# Patient Record
Sex: FEMALE | URBAN - METROPOLITAN AREA
[De-identification: names, ages, dates, MRNs, and addresses within clinical notes are randomized per-mention and may not be internally consistent; named-entity substitution may affect disease eponyms.]

---

## 2021-12-15 ENCOUNTER — OFFICE VISIT (OUTPATIENT)
Dept: URBAN - METROPOLITAN AREA CLINIC 12 | Facility: CLINIC | Age: 71
End: 2021-12-15
Payer: MEDICARE

## 2021-12-15 ENCOUNTER — DASHBOARD ENCOUNTERS (OUTPATIENT)
Age: 71
End: 2021-12-15

## 2021-12-15 DIAGNOSIS — Z12.11 SCREEN FOR COLON CANCER: ICD-10-CM

## 2021-12-15 DIAGNOSIS — R74.8 ELEVATED LIVER ENZYMES: ICD-10-CM

## 2021-12-15 PROCEDURE — 99203 OFFICE O/P NEW LOW 30 MIN: CPT | Performed by: INTERNAL MEDICINE

## 2021-12-15 PROCEDURE — 99243 OFF/OP CNSLTJ NEW/EST LOW 30: CPT | Performed by: INTERNAL MEDICINE

## 2021-12-15 NOTE — HPI-TODAY'S VISIT:
72 yo female presenting on referral from Dr. Alexis Mclain with [facility] for elevated liver enzymes.  A copy of this note will be sent to the referring physician.    -found to have liver enzyme elevation on routine labs/test- ast /alt 112/202.  -never been told previously there are elevated enzymes -no new medication current medications- no prescription new herbal supplement- she stopped all the supplements she was taking except biotin, was taking multiple supplements before that actually - herbal probiotic as well as vitamins. biowork 4 -etoh- wine once a week, hx ivdu-never. no tobacco.  works in the [employer] industry-  -denies ibuprofen , alleve, aspirin, tylenol -recent diagnosis of dm, on dietary control now and has glucose monitoring.   + high cholesterol. working on weight loss -fh of liver disease- father with history of liver disease but states that he was on amiodarone and etoh history and developed liver issues -unsure when she would have had bloodwork normal before -denies abdominal pain, abdominal swelling, yellowing of skin, darkening of urine

## 2022-03-10 ENCOUNTER — TELEPHONE ENCOUNTER (OUTPATIENT)
Dept: URBAN - METROPOLITAN AREA CLINIC 49 | Facility: CLINIC | Age: 72
End: 2022-03-10

## 2022-03-15 ENCOUNTER — OFFICE VISIT (OUTPATIENT)
Dept: URBAN - METROPOLITAN AREA SURGERY CENTER 15 | Facility: SURGERY CENTER | Age: 72
End: 2022-03-15

## 2022-04-21 ENCOUNTER — TELEPHONE ENCOUNTER (OUTPATIENT)
Dept: URBAN - METROPOLITAN AREA CLINIC 23 | Facility: CLINIC | Age: 72
End: 2022-04-21

## 2022-04-25 ENCOUNTER — OFFICE VISIT (OUTPATIENT)
Dept: URBAN - METROPOLITAN AREA SURGERY CENTER 15 | Facility: SURGERY CENTER | Age: 72
End: 2022-04-25
Payer: MEDICARE

## 2022-04-25 ENCOUNTER — TELEPHONE ENCOUNTER (OUTPATIENT)
Dept: URBAN - METROPOLITAN AREA CLINIC 92 | Facility: CLINIC | Age: 72
End: 2022-04-25

## 2022-04-25 DIAGNOSIS — Z53.8 FAILED ATTEMPTED SURGICAL PROCEDURE: ICD-10-CM

## 2022-04-25 DIAGNOSIS — Z12.11 COLON CANCER SCREENING: ICD-10-CM

## 2022-04-25 PROCEDURE — G8907 PT DOC NO EVENTS ON DISCHARG: HCPCS | Performed by: INTERNAL MEDICINE

## 2022-04-25 PROCEDURE — G0121 COLON CA SCRN NOT HI RSK IND: HCPCS | Performed by: INTERNAL MEDICINE

## 2022-04-26 ENCOUNTER — CLAIMS CREATED FROM THE CLAIM WINDOW (OUTPATIENT)
Dept: URBAN - METROPOLITAN AREA CLINIC 4 | Facility: CLINIC | Age: 72
End: 2022-04-26
Payer: MEDICARE

## 2022-04-26 ENCOUNTER — OFFICE VISIT (OUTPATIENT)
Dept: URBAN - METROPOLITAN AREA SURGERY CENTER 15 | Facility: SURGERY CENTER | Age: 72
End: 2022-04-26
Payer: MEDICARE

## 2022-04-26 DIAGNOSIS — D12.0 ADENOMA OF CECUM: ICD-10-CM

## 2022-04-26 DIAGNOSIS — D12.0 BENIGN NEOPLASM OF CECUM: ICD-10-CM

## 2022-04-26 DIAGNOSIS — K63.89 CYST OF DUODENUM: ICD-10-CM

## 2022-04-26 DIAGNOSIS — D12.3 BENIGN NEOPLASM OF TRANSVERSE COLON: ICD-10-CM

## 2022-04-26 DIAGNOSIS — D12.3 ADENOMA OF TRANSVERSE COLON: ICD-10-CM

## 2022-04-26 DIAGNOSIS — Z12.11 COLON CANCER SCREENING: ICD-10-CM

## 2022-04-26 PROBLEM — 305058001: Status: ACTIVE | Noted: 2021-12-15

## 2022-04-26 PROCEDURE — 88305 TISSUE EXAM BY PATHOLOGIST: CPT | Performed by: PATHOLOGY

## 2022-04-26 PROCEDURE — G8907 PT DOC NO EVENTS ON DISCHARG: HCPCS | Performed by: INTERNAL MEDICINE

## 2022-04-26 PROCEDURE — 45385 COLONOSCOPY W/LESION REMOVAL: CPT | Performed by: INTERNAL MEDICINE

## 2022-04-26 PROCEDURE — 45380 COLONOSCOPY AND BIOPSY: CPT | Performed by: INTERNAL MEDICINE

## 2024-10-07 ENCOUNTER — OFFICE VISIT (OUTPATIENT)
Dept: URBAN - METROPOLITAN AREA CLINIC 12 | Facility: CLINIC | Age: 74
End: 2024-10-07
Payer: MEDICARE

## 2024-10-07 VITALS
DIASTOLIC BLOOD PRESSURE: 68 MMHG | WEIGHT: 163 LBS | TEMPERATURE: 97.37 F | SYSTOLIC BLOOD PRESSURE: 111 MMHG | BODY MASS INDEX: 28.88 KG/M2 | HEIGHT: 63 IN

## 2024-10-07 DIAGNOSIS — R19.8 CHANGE IN BOWEL MOVEMENT: ICD-10-CM

## 2024-10-07 DIAGNOSIS — R19.5 LOOSE STOOLS: ICD-10-CM

## 2024-10-07 DIAGNOSIS — R14.0 BLOATING: ICD-10-CM

## 2024-10-07 PROCEDURE — 99214 OFFICE O/P EST MOD 30 MIN: CPT | Performed by: STUDENT IN AN ORGANIZED HEALTH CARE EDUCATION/TRAINING PROGRAM

## 2024-10-07 RX ORDER — MELATONIN 3 MG
1 TABLET AT BEDTIME AS NEEDED TABLET ORAL ONCE A DAY
Status: ACTIVE | COMMUNITY

## 2024-10-07 RX ORDER — BIOTIN 10 MG
1 TABLET TABLET ORAL ONCE A DAY
Status: ACTIVE | COMMUNITY

## 2024-10-07 RX ORDER — CHOLECALCIFEROL (VITAMIN D3) 50 MCG
1 TABLET TABLET ORAL ONCE A DAY
Status: ACTIVE | COMMUNITY

## 2024-10-07 RX ORDER — MAGNESIUM OXIDE/MAG AA CHELATE 300 MG
1 CAPSULE WITH A MEAL CAPSULE ORAL ONCE A DAY
Status: ACTIVE | COMMUNITY

## 2024-10-07 NOTE — HPI-OTHER HISTORIES
Last visit 2021:       70 yo female presenting on referral from Dr. Alexis Mclain with [facility] for elevated liver enzymes. A copy of this note will be sent to the referring physician.        -found to have liver enzyme elevation on routine labs/test- ast /alt 112/202.        -never been told previously there are elevated enzymes        -no new medication        current medications- no prescription        new herbal supplement- she stopped all the supplements she was taking except biotin, was taking multiple supplements before that actually - herbal probiotic as well as vitamins. biowork 4        -etoh- wine once a week, hx ivdu-never. no tobacco. works in the [employer] industry-        -denies ibuprofen , alleve, aspirin, tylenol        -recent diagnosis of dm, on dietary control now and has glucose monitoring. + high cholesterol. working on weight loss        -fh of liver disease- father with history of liver disease but states that he was on amiodarone and etoh history and developed liver issues        -unsure when she would have had bloodwork normal before        -denies abdominal pain, abdominal swelling, yellowing of skin, darkening of urine.

## 2024-10-07 NOTE — HPI-TODAY'S VISIT:
73 yo F here for evaluation of change in BMs and bloating.  Previously has seen Dr. Taylor for screening colon and elevated liver enzymes.  Colonoscopy 2022 with 5 polyps removed, 3 hyperplastic and 2 TAs. Says she was in [place] from April to July. Sx began when she was abroad.  Says her BMs have changed  -- "more squiggly" Says sometimes stool is looser, sometimes explosive.  Denies constipation.  Feels gassy and bloated. Sometimes worse after eating. She says her liver enzymes went back to normal.

## 2024-11-04 ENCOUNTER — OFFICE VISIT (OUTPATIENT)
Dept: URBAN - METROPOLITAN AREA CLINIC 12 | Facility: CLINIC | Age: 74
End: 2024-11-04